# Patient Record
Sex: FEMALE | Race: WHITE | NOT HISPANIC OR LATINO | URBAN - NONMETROPOLITAN AREA
[De-identification: names, ages, dates, MRNs, and addresses within clinical notes are randomized per-mention and may not be internally consistent; named-entity substitution may affect disease eponyms.]

---

## 2024-07-19 ENCOUNTER — HOSPITAL ENCOUNTER (OUTPATIENT)
Dept: RADIOLOGY | Facility: HOSPITAL | Age: 79
Discharge: HOME | End: 2024-07-19
Payer: COMMERCIAL

## 2024-07-19 DIAGNOSIS — Z13.6 ENCOUNTER FOR SCREENING FOR CARDIOVASCULAR DISORDERS: ICD-10-CM

## 2024-07-19 PROCEDURE — 75571 CT HRT W/O DYE W/CA TEST: CPT

## 2024-10-01 ENCOUNTER — HOSPITAL ENCOUNTER (EMERGENCY)
Facility: HOSPITAL | Age: 79
Discharge: HOME | End: 2024-10-01
Attending: EMERGENCY MEDICINE
Payer: COMMERCIAL

## 2024-10-01 VITALS
OXYGEN SATURATION: 95 % | RESPIRATION RATE: 16 BRPM | WEIGHT: 139 LBS | TEMPERATURE: 97.7 F | HEIGHT: 62 IN | HEART RATE: 70 BPM | DIASTOLIC BLOOD PRESSURE: 79 MMHG | SYSTOLIC BLOOD PRESSURE: 144 MMHG | BODY MASS INDEX: 25.58 KG/M2

## 2024-10-01 DIAGNOSIS — S60.511A ABRASION OF RIGHT HAND, INITIAL ENCOUNTER: ICD-10-CM

## 2024-10-01 DIAGNOSIS — S60.511A CAT SCRATCH OF RIGHT HAND, INITIAL ENCOUNTER: Primary | ICD-10-CM
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
## 2024-10-01 DIAGNOSIS — S60.221A HEMATOMA OF RIGHT HAND: ICD-10-CM

## 2024-10-01 PROCEDURE — 99283 EMERGENCY DEPT VISIT LOW MDM: CPT

## 2024-10-01 RX ORDER — AMOXICILLIN AND CLAVULANATE POTASSIUM 875; 125 MG/1; MG/1
875 TABLET, FILM COATED ORAL 2 TIMES DAILY
Qty: 10 TABLET | Refills: 0 | Status: SHIPPED | OUTPATIENT
Start: 2024-10-01 | End: 2024-10-06

## 2024-10-01 ASSESSMENT — COLUMBIA-SUICIDE SEVERITY RATING SCALE - C-SSRS
2. HAVE YOU ACTUALLY HAD ANY THOUGHTS OF KILLING YOURSELF?: NO
1. IN THE PAST MONTH, HAVE YOU WISHED YOU WERE DEAD OR WISHED YOU COULD GO TO SLEEP AND NOT WAKE UP?: NO
6. HAVE YOU EVER DONE ANYTHING, STARTED TO DO ANYTHING, OR PREPARED TO DO ANYTHING TO END YOUR LIFE?: NO

## 2024-10-01 ASSESSMENT — PAIN SCALES - GENERAL: PAINLEVEL_OUTOF10: 0 - NO PAIN

## 2024-10-01 ASSESSMENT — PAIN - FUNCTIONAL ASSESSMENT: PAIN_FUNCTIONAL_ASSESSMENT: 0-10

## 2024-10-01 NOTE — ED PROVIDER NOTES
HPI   Chief Complaint   Patient presents with    cat scratch     While feeding her cat this am, right hand was scratched, hand bruised and swollen       78-year-old female presents with cat scratch to right hand.  She was feeding her cat this morning and the cat accidentally scratched her left hand.  She has swelling of the area at the base of the thumb.      History provided by:  Patient and medical records          Patient History   No past medical history on file.  No past surgical history on file.  No family history on file.  Social History     Tobacco Use    Smoking status: Not on file    Smokeless tobacco: Not on file   Substance Use Topics    Alcohol use: Not on file    Drug use: Not on file       Physical Exam   ED Triage Vitals [10/01/24 0738]   Temperature Heart Rate Respirations BP   36.5 °C (97.7 °F) 70 16 144/79      Pulse Ox Temp Source Heart Rate Source Patient Position   95 % Oral -- Sitting      BP Location FiO2 (%)     Left arm --       Physical Exam  Vitals and nursing note reviewed.   Constitutional:       General: She is not in acute distress.     Appearance: She is well-developed.   HENT:      Head: Normocephalic and atraumatic.   Eyes:      Conjunctiva/sclera: Conjunctivae normal.   Cardiovascular:      Rate and Rhythm: Normal rate and regular rhythm.      Heart sounds: No murmur heard.  Pulmonary:      Effort: Pulmonary effort is normal. No respiratory distress.      Breath sounds: Normal breath sounds.   Abdominal:      Palpations: Abdomen is soft.      Tenderness: There is no abdominal tenderness.   Musculoskeletal:         General: Swelling and tenderness present. No deformity.      Right hand: Swelling present. No deformity.        Hands:       Cervical back: Neck supple.      Comments: Hematoma and 2 puncture wounds at the base of the left thumb.  Compartments of left hand are soft, compressible.  Normal range of motion of left thumb.  Neurovascularly intact to left upper extremity.    Skin:     General: Skin is warm and dry.      Capillary Refill: Capillary refill takes less than 2 seconds.      Findings: Bruising and wound present.   Neurological:      Mental Status: She is alert.      Cranial Nerves: No cranial nerve deficit.      Sensory: No sensory deficit.      Motor: No weakness.   Psychiatric:         Mood and Affect: Mood normal.           ED Course & MDM   ED Course as of 10/01/24 1819   Tue Oct 01, 2024   1818 78-year-old female with cat scratch to left thumb.  She has a hematoma of the left thumb.  Likely injured one of the veins of the left thumb.  Most likely self-limited issue.  Advised to elevate and ice the left thumb in the area of the wound.  Placed on Augmentin.  The hematoma was outlined.  Referred to PCP for follow-up.  Advised to return with worsening or spreading swelling, sensory changes to the thumb, or any other concerns.  Patient agreeable with plan and verbalized understanding.  Discharged home. [BT]      ED Course User Index  [BT] Giovanni Asher DO         Diagnoses as of 10/01/24 1819   Cat scratch of right hand, initial encounter   Hematoma of right hand   Abrasion of right hand, initial encounter                 No data recorded     Shari Coma Scale Score: 15 (10/01/24 0747 : Sandhya Call RN)                           Medical Decision Making      Procedure  Procedures     Giovanni Asher DO  10/01/24 1819